# Patient Record
Sex: FEMALE | Race: BLACK OR AFRICAN AMERICAN | NOT HISPANIC OR LATINO | URBAN - METROPOLITAN AREA
[De-identification: names, ages, dates, MRNs, and addresses within clinical notes are randomized per-mention and may not be internally consistent; named-entity substitution may affect disease eponyms.]

---

## 2018-09-13 ENCOUNTER — EMERGENCY (EMERGENCY)
Facility: HOSPITAL | Age: 43
LOS: 1 days | Discharge: HOME | End: 2018-09-13
Admitting: EMERGENCY MEDICINE
Payer: MEDICAID

## 2018-09-13 VITALS
RESPIRATION RATE: 20 BRPM | OXYGEN SATURATION: 99 % | SYSTOLIC BLOOD PRESSURE: 126 MMHG | DIASTOLIC BLOOD PRESSURE: 66 MMHG | TEMPERATURE: 99 F | HEART RATE: 75 BPM

## 2018-09-13 LAB
ALBUMIN SERPL ELPH-MCNC: 4 G/DL — SIGNIFICANT CHANGE UP (ref 3.3–5)
ALP SERPL-CCNC: 88 U/L — SIGNIFICANT CHANGE UP (ref 40–120)
ALT FLD-CCNC: 15 U/L — SIGNIFICANT CHANGE UP (ref 4–33)
APPEARANCE UR: CLEAR — SIGNIFICANT CHANGE UP
AST SERPL-CCNC: 38 U/L — HIGH (ref 4–32)
BACTERIA # UR AUTO: NEGATIVE — SIGNIFICANT CHANGE UP
BASOPHILS # BLD AUTO: 0.02 K/UL — SIGNIFICANT CHANGE UP (ref 0–0.2)
BASOPHILS NFR BLD AUTO: 0.3 % — SIGNIFICANT CHANGE UP (ref 0–2)
BILIRUB SERPL-MCNC: 0.3 MG/DL — SIGNIFICANT CHANGE UP (ref 0.2–1.2)
BILIRUB UR-MCNC: NEGATIVE — SIGNIFICANT CHANGE UP
BLOOD UR QL VISUAL: HIGH
BUN SERPL-MCNC: 8 MG/DL — SIGNIFICANT CHANGE UP (ref 7–23)
CALCIUM SERPL-MCNC: 9 MG/DL — SIGNIFICANT CHANGE UP (ref 8.4–10.5)
CHLORIDE SERPL-SCNC: 100 MMOL/L — SIGNIFICANT CHANGE UP (ref 98–107)
CO2 SERPL-SCNC: 21 MMOL/L — LOW (ref 22–31)
COLOR SPEC: SIGNIFICANT CHANGE UP
CREAT SERPL-MCNC: 0.67 MG/DL — SIGNIFICANT CHANGE UP (ref 0.5–1.3)
EOSINOPHIL # BLD AUTO: 0.25 K/UL — SIGNIFICANT CHANGE UP (ref 0–0.5)
EOSINOPHIL NFR BLD AUTO: 4 % — SIGNIFICANT CHANGE UP (ref 0–6)
GLUCOSE SERPL-MCNC: 79 MG/DL — SIGNIFICANT CHANGE UP (ref 70–99)
GLUCOSE UR-MCNC: NEGATIVE — SIGNIFICANT CHANGE UP
HCG SERPL-ACNC: 1003 MIU/ML — SIGNIFICANT CHANGE UP
HCT VFR BLD CALC: 38.8 % — SIGNIFICANT CHANGE UP (ref 34.5–45)
HGB BLD-MCNC: 12.5 G/DL — SIGNIFICANT CHANGE UP (ref 11.5–15.5)
HYALINE CASTS # UR AUTO: NEGATIVE — SIGNIFICANT CHANGE UP
IMM GRANULOCYTES # BLD AUTO: 0.02 # — SIGNIFICANT CHANGE UP
IMM GRANULOCYTES NFR BLD AUTO: 0.3 % — SIGNIFICANT CHANGE UP (ref 0–1.5)
KETONES UR-MCNC: NEGATIVE — SIGNIFICANT CHANGE UP
LEUKOCYTE ESTERASE UR-ACNC: NEGATIVE — SIGNIFICANT CHANGE UP
LYMPHOCYTES # BLD AUTO: 2.4 K/UL — SIGNIFICANT CHANGE UP (ref 1–3.3)
LYMPHOCYTES # BLD AUTO: 38 % — SIGNIFICANT CHANGE UP (ref 13–44)
MCHC RBC-ENTMCNC: 27.5 PG — SIGNIFICANT CHANGE UP (ref 27–34)
MCHC RBC-ENTMCNC: 32.2 % — SIGNIFICANT CHANGE UP (ref 32–36)
MCV RBC AUTO: 85.3 FL — SIGNIFICANT CHANGE UP (ref 80–100)
MONOCYTES # BLD AUTO: 0.46 K/UL — SIGNIFICANT CHANGE UP (ref 0–0.9)
MONOCYTES NFR BLD AUTO: 7.3 % — SIGNIFICANT CHANGE UP (ref 2–14)
NEUTROPHILS # BLD AUTO: 3.17 K/UL — SIGNIFICANT CHANGE UP (ref 1.8–7.4)
NEUTROPHILS NFR BLD AUTO: 50.1 % — SIGNIFICANT CHANGE UP (ref 43–77)
NITRITE UR-MCNC: NEGATIVE — SIGNIFICANT CHANGE UP
NRBC # FLD: 0 — SIGNIFICANT CHANGE UP
PH UR: 6 — SIGNIFICANT CHANGE UP (ref 5–8)
PLATELET # BLD AUTO: 260 K/UL — SIGNIFICANT CHANGE UP (ref 150–400)
PMV BLD: 11.8 FL — SIGNIFICANT CHANGE UP (ref 7–13)
POTASSIUM SERPL-MCNC: 5.3 MMOL/L — SIGNIFICANT CHANGE UP (ref 3.5–5.3)
POTASSIUM SERPL-SCNC: 5.3 MMOL/L — SIGNIFICANT CHANGE UP (ref 3.5–5.3)
PROT SERPL-MCNC: 7.8 G/DL — SIGNIFICANT CHANGE UP (ref 6–8.3)
PROT UR-MCNC: NEGATIVE — SIGNIFICANT CHANGE UP
RBC # BLD: 4.55 M/UL — SIGNIFICANT CHANGE UP (ref 3.8–5.2)
RBC # FLD: 14.8 % — HIGH (ref 10.3–14.5)
RBC CASTS # UR COMP ASSIST: >50 — HIGH (ref 0–?)
SODIUM SERPL-SCNC: 134 MMOL/L — LOW (ref 135–145)
SP GR SPEC: 1.01 — SIGNIFICANT CHANGE UP (ref 1–1.04)
SQUAMOUS # UR AUTO: SIGNIFICANT CHANGE UP
UROBILINOGEN FLD QL: NORMAL — SIGNIFICANT CHANGE UP
WBC # BLD: 6.32 K/UL — SIGNIFICANT CHANGE UP (ref 3.8–10.5)
WBC # FLD AUTO: 6.32 K/UL — SIGNIFICANT CHANGE UP (ref 3.8–10.5)
WBC UR QL: SIGNIFICANT CHANGE UP (ref 0–?)

## 2018-09-13 PROCEDURE — 76830 TRANSVAGINAL US NON-OB: CPT | Mod: 26

## 2018-09-13 PROCEDURE — 99284 EMERGENCY DEPT VISIT MOD MDM: CPT

## 2018-09-13 PROCEDURE — 99283 EMERGENCY DEPT VISIT LOW MDM: CPT | Mod: GC

## 2018-09-13 NOTE — ED ADULT TRIAGE NOTE - CHIEF COMPLAINT QUOTE
c/o vaginal bleeding abdominal cramping  pt took home pregnancy one week ago positive pregnancy test pt unsure of how many weeks pt last menstrual cycle July 30

## 2018-09-13 NOTE — ED ADULT NURSE NOTE - OBJECTIVE STATEMENT
43 y female A+Ox3 presents to the ER with the complaint of vaginal bleeding. Pt states 2 weeks ago she took an at home pregnancy test which said she was pregnant. Pt was to follow up with OBGYN tomorrow but around 4pm started experiencing heavy vaginal bleeding with the passing of clots. Pt has had 7 previous pregnancies with the last 3 being miscarriages. Denies any other PMH. Denies sob, chest pain, dizziness, N/V/D. 20 g placed in right ac, labs drawn and sent. Will continue to monitor.

## 2018-09-13 NOTE — ED PROVIDER NOTE - CHPI ED SYMPTOMS NEG
no dysuria/no fever/no vaginal discharge/no vomiting/no abdominal pain/no back pain/no chills/no discharge

## 2018-09-13 NOTE — ED PROVIDER NOTE - MEDICAL DECISION MAKING DETAILS
Pt is a 44 y/o F nonsmoker PMHx ectopic pregnancy, miscarriage, p/w vaginal bleeding in pregnancy since yesterday -- r/o ectopic pregnancy, threatened miscarriage -- labs, hcg, ua, ucx, transvaginal sono, T&S

## 2018-09-13 NOTE — ED PROVIDER NOTE - PROGRESS NOTE DETAILS
Rick SANCHEZ: Pt signed out to me.  She reports she is feeling better and would like to go home.  She has been evaluated by OB, plan for repeat beta in 48hours.  Pt understands importance of follow-up, stable for dc home.

## 2018-09-13 NOTE — ED PROVIDER NOTE - OBJECTIVE STATEMENT
Pt is a 44 y/o F nonsmoker PMHx ectopic pregnancy, miscarriage, p/w vaginal bleeding in pregnancy since yesterday.  Pt is .  Pt states she had positive home pregnancy test yesterday after which pt had vaginal spotting.  Pt states bleeding mildly heavier today, requiring one pad today.  Pt notes mild pelvic cramping, similar to menstrual cramps.  Pt denies any fevers, chills, nausea, vomiting, chest pain, SOB, dysuria, cloudy urine, diarrhea, constipation, melena, brbpr.  Pt has not yet had an evaluation by OB/GYN.

## 2018-09-13 NOTE — ED PROVIDER NOTE - NONTENDER LOCATION
periumbilical/left costovertebral angle/left upper quadrant/right upper quadrant/right costovertebral angle/right lower quadrant/left lower quadrant/umbilical/suprapubic

## 2018-09-14 VITALS
TEMPERATURE: 98 F | DIASTOLIC BLOOD PRESSURE: 53 MMHG | OXYGEN SATURATION: 100 % | SYSTOLIC BLOOD PRESSURE: 111 MMHG | RESPIRATION RATE: 18 BRPM | HEART RATE: 71 BPM

## 2018-09-14 DIAGNOSIS — O20.8 OTHER HEMORRHAGE IN EARLY PREGNANCY: ICD-10-CM

## 2018-09-14 LAB — RH IG SCN BLD-IMP: NEGATIVE — SIGNIFICANT CHANGE UP

## 2018-09-14 NOTE — CONSULT NOTE ADULT - ASSESSMENT
42 yo  6w4d presents with vaginal bleeding. Physical exam unremarkable. bHCG 1003, below the zone of discrimination. TVUS shows no evidence of pregnancy, Blood type Rh negative.

## 2018-09-14 NOTE — CONSULT NOTE ADULT - SUBJECTIVE AND OBJECTIVE BOX
43y G_P_ presents with   HPI:      OB/GYN HISTORY:   [place]:  Parity:  Term:  :  MAB/TAB/Ectopic:  Living:    Last Menstrual Period    Name of GYN Physician:  Date of Last Pap:  History of Abnormal Pap:  Date of Last Mammogram:  Date of Last Colonoscopy:  Current OCP use:     PAST MEDICAL & SURGICAL HISTORY:  Miscarriage  Ectopic pregnancy      REVIEW OF SYSTEMS      General:	    Skin/Breast:  	  Ophthalmologic:  	  ENMT:	    Respiratory and Thorax:  	  Cardiovascular:	    Gastrointestinal:	    Genitourinary:	    Musculoskeletal:	    Neurological:	    Psychiatric:	    Hematology/Lymphatics:	    Endocrine:	    Allergic/Immunologic:	    MEDICATIONS  (STANDING):    MEDICATIONS  (PRN):      Allergies    No Known Allergies    Intolerances        SOCIAL HISTORY:    FAMILY HISTORY:      Vital Signs Last 24 Hrs  T(C): 36.9 (14 Sep 2018 02:14), Max: 37.1 (13 Sep 2018 17:43)  T(F): 98.4 (14 Sep 2018 02:14), Max: 98.7 (13 Sep 2018 17:43)  HR: 71 (14 Sep 2018 02:14) (71 - 75)  BP: 111/53 (14 Sep 2018 02:14) (111/53 - 126/66)  BP(mean): --  RR: 18 (14 Sep 2018 02:14) (18 - 20)  SpO2: 100% (14 Sep 2018 02:14) (99% - 100%)    PHYSICAL EXAM:      Constitutional: alert and oriented x 3    Breasts: no tenderness, no nodules    Respiratory: clear    Cardiovascular: regular rate and rhythm    Gastrointestinal: soft, non tender, + bowel sounds. No hepatosplenomegaly, no palpable masses    Genitourinary: NEFG  Cervix: closed/ long, no CMT  Uterus: normal size, non tender  Adnexa: non tender, no palpable masses    Rectal:     Extremities:    Neurological:    Skin:    Lymph Nodes:        LABS:                        12.5   6.32  )-----------( 260      ( 13 Sep 2018 20:41 )             38.8         134<L>  |  100  |  8   ----------------------------<  79  5.3   |  21<L>  |  0.67    Ca    9.0      13 Sep 2018 20:41    TPro  7.8  /  Alb  4.0  /  TBili  0.3  /  DBili  x   /  AST  38<H>  /  ALT  15  /  AlkPhos  88        Urinalysis Basic - ( 13 Sep 2018 20:45 )    Color: LIGHT YELLOW / Appearance: CLEAR / S.011 / pH: 6.0  Gluc: NEGATIVE / Ketone: NEGATIVE  / Bili: NEGATIVE / Urobili: NORMAL   Blood: LARGE / Protein: NEGATIVE / Nitrite: NEGATIVE   Leuk Esterase: NEGATIVE / RBC: >50 / WBC 0-2   Sq Epi: OCC / Non Sq Epi: x / Bacteria: NEGATIVE        RADIOLOGY & ADDITIONAL STUDIES: R2 Consult Note    43y  LMP  6w4d pregnant presents with vaginal bleeding and abdominal pain for 8 hours. She states that earlier this evening, she began to experience heavy vaginal bleeding soaking thru 1 pad per hour and decided to come to the ED. She found out she was pregnant 2 weeks ago by home pregnancy test but has not had her first prenatal visit yet. She additionally reports crampy abdominal pain that is non-radiating and is a 6/10 on the pain scale. Denies lightheadedness, dizziness, CP, SOB, palpitations, dysuria, urinary urgency/frequency.     OB/GYN HISTORY:   [place]: 7  Parity: 4  x4 in , , ,   Term: 4  : 0  MAB/TAB/Ectopic: 2 sab. patient specifically states that she passed POC on her own and never received medication for passage of tissues (cytotec, methotrexate).  [place]    Last Menstrual Period 18    Name of GYN Physician: Dr. Zuniga     PAST MEDICAL & SURGICAL HISTORY:  Miscarriage        REVIEW OF SYSTEMS  General: denies fevers, chills, sweats, lightheadedness, dizziness  Respiratory and Thorax:	denies SOB  Cardiovascular:	denies CP, palpitations  Gastrointestinal:	+abdominal cramping. Denies N/v  Genitourinary: +vaginal bleeding. Denies dysuria, urinary urgency/frequency        MEDICATIONS  (STANDING):    MEDICATIONS  (PRN):      Allergies    No Known Allergies    Intolerances        SOCIAL HISTORY: denies x3        Vital Signs Last 24 Hrs  T(C): 36.9 (14 Sep 2018 02:14), Max: 37.1 (13 Sep 2018 17:43)  T(F): 98.4 (14 Sep 2018 02:14), Max: 98.7 (13 Sep 2018 17:43)  HR: 71 (14 Sep 2018 02:14) (71 - 75)  BP: 111/53 (14 Sep 2018 02:14) (111/53 - 126/66)  BP(mean): --  RR: 18 (14 Sep 2018 02:14) (18 - 20)  SpO2: 100% (14 Sep 2018 02:14) (99% - 100%)    PHYSICAL EXAM:  Constitutional: alert and oriented x 3  Respiratory: CTA-B, symmetrical expansion  Cardiovascular: RRR, no murmurs, S1, S2  Gastrointestinal: soft, non tender, + bowel sounds. No hepatosplenomegaly, no palpable masses  Genitourinary: NEFG  Cervix: closed/ long, no CMT, small amount of dark red blood in vaginal vault with no active bleeding noted from cervix  Uterus: normal size, non tender  Adnexa: non tender, no palpable masses        LABS:                        12.5   6.32  )-----------( 260      ( 13 Sep 2018 20:41 )             38.8         134<L>  |  100  |  8   ----------------------------<  79  5.3   |  21<L>  |  0.67    Ca    9.0      13 Sep 2018 20:41    TPro  7.8  /  Alb  4.0  /  TBili  0.3  /  DBili  x   /  AST  38<H>  /  ALT  15  /  AlkPhos  88        Urinalysis Basic - ( 13 Sep 2018 20:45 )    Color: LIGHT YELLOW / Appearance: CLEAR / S.011 / pH: 6.0  Gluc: NEGATIVE / Ketone: NEGATIVE  / Bili: NEGATIVE / Urobili: NORMAL   Blood: LARGE / Protein: NEGATIVE / Nitrite: NEGATIVE   Leuk Esterase: NEGATIVE / RBC: >50 / WBC 0-2   Sq Epi: OCC / Non Sq Epi: x / Bacteria: NEGATIVE        RADIOLOGY & ADDITIONAL STUDIES:  EXAM:  US TRANSVAGINAL        PROCEDURE DATE:  Sep 13 2018         INTERPRETATION:  CLINICAL INFORMATION: Heavy vaginal bleeding with clots   for one day. Beta-hCG levels 103.    LMP: 2018.    Gestational age by LMP: 6 weeks 3 days    COMPARISON: None available.    TECHNIQUE:     Endovaginal pelvic sonogram as per order. Transabdominal pelvic sonogram   was also performed as part of our standard protocol. Color and Spectral   Doppler was performed.    FINDINGS:    Uterus: 13.1 x 6.7 x 7.9 cm. No intrauterine gestational sac or fetal   pole is noted. Fundal fibroid measuring 2.9 x 2.3 x 2.1 cm.    Right ovary: 3.3 x 2.1 x 2.2 cm. Within normal limits. Doppler flow is   within normal limits    Left ovary: 3.2 x 3.1 x 2.9 cm. Within normal limits. Doppler flow is   within normal limits.    Fluid: None.    IMPRESSION:    No evidence of a intrauterine gestational sac or fetal pole. Differential   includes early pregnancy, missed  or ectopic pregnancy. Further   evaluation with serial beta-hCG levels and short term follow-up   ultrasound is recommended.    Fibroid uterus

## 2018-09-14 NOTE — CONSULT NOTE ADULT - PROBLEM SELECTOR RECOMMENDATION 9
- rhogam for Rh negative status  - patient to return to ED in 2 days for repeat bHCG to evaluate for trend  - Patient encouraged to return to ED if she experiences any of the following symptoms: heavy vaginal bleeding (soaking thru 1 pad per hour x 2 hours), pain uncontrolled with pain mediations, lightheadedness, dizziness, syncope    seen with Dr. Mayank Becerra pgy2

## 2018-09-15 ENCOUNTER — EMERGENCY (EMERGENCY)
Facility: HOSPITAL | Age: 43
LOS: 1 days | Discharge: HOME | End: 2018-09-15
Attending: EMERGENCY MEDICINE | Admitting: EMERGENCY MEDICINE
Payer: MEDICAID

## 2018-09-15 VITALS
TEMPERATURE: 99 F | SYSTOLIC BLOOD PRESSURE: 140 MMHG | HEART RATE: 72 BPM | RESPIRATION RATE: 20 BRPM | OXYGEN SATURATION: 100 % | DIASTOLIC BLOOD PRESSURE: 70 MMHG

## 2018-09-15 LAB
ALBUMIN SERPL ELPH-MCNC: 3.6 G/DL — SIGNIFICANT CHANGE UP (ref 3.3–5)
ALP SERPL-CCNC: 88 U/L — SIGNIFICANT CHANGE UP (ref 40–120)
ALT FLD-CCNC: 8 U/L — SIGNIFICANT CHANGE UP (ref 4–33)
AST SERPL-CCNC: 15 U/L — SIGNIFICANT CHANGE UP (ref 4–32)
BACTERIA UR CULT: SIGNIFICANT CHANGE UP
BASOPHILS # BLD AUTO: 0.02 K/UL — SIGNIFICANT CHANGE UP (ref 0–0.2)
BASOPHILS NFR BLD AUTO: 0.3 % — SIGNIFICANT CHANGE UP (ref 0–2)
BILIRUB SERPL-MCNC: 0.2 MG/DL — SIGNIFICANT CHANGE UP (ref 0.2–1.2)
BLD GP AB SCN SERPL QL: POSITIVE — SIGNIFICANT CHANGE UP
BUN SERPL-MCNC: 9 MG/DL — SIGNIFICANT CHANGE UP (ref 7–23)
CALCIUM SERPL-MCNC: 9 MG/DL — SIGNIFICANT CHANGE UP (ref 8.4–10.5)
CHLORIDE SERPL-SCNC: 101 MMOL/L — SIGNIFICANT CHANGE UP (ref 98–107)
CO2 SERPL-SCNC: 23 MMOL/L — SIGNIFICANT CHANGE UP (ref 22–31)
CREAT SERPL-MCNC: 0.72 MG/DL — SIGNIFICANT CHANGE UP (ref 0.5–1.3)
EOSINOPHIL # BLD AUTO: 0.28 K/UL — SIGNIFICANT CHANGE UP (ref 0–0.5)
EOSINOPHIL NFR BLD AUTO: 3.8 % — SIGNIFICANT CHANGE UP (ref 0–6)
GLUCOSE SERPL-MCNC: 90 MG/DL — SIGNIFICANT CHANGE UP (ref 70–99)
HCG SERPL-ACNC: 652.2 MIU/ML — SIGNIFICANT CHANGE UP
HCT VFR BLD CALC: 33.9 % — LOW (ref 34.5–45)
HGB BLD-MCNC: 11.1 G/DL — LOW (ref 11.5–15.5)
IMM GRANULOCYTES # BLD AUTO: 0.02 # — SIGNIFICANT CHANGE UP
IMM GRANULOCYTES NFR BLD AUTO: 0.3 % — SIGNIFICANT CHANGE UP (ref 0–1.5)
LYMPHOCYTES # BLD AUTO: 2.53 K/UL — SIGNIFICANT CHANGE UP (ref 1–3.3)
LYMPHOCYTES # BLD AUTO: 34 % — SIGNIFICANT CHANGE UP (ref 13–44)
MCHC RBC-ENTMCNC: 28.6 PG — SIGNIFICANT CHANGE UP (ref 27–34)
MCHC RBC-ENTMCNC: 32.7 % — SIGNIFICANT CHANGE UP (ref 32–36)
MCV RBC AUTO: 87.4 FL — SIGNIFICANT CHANGE UP (ref 80–100)
MONOCYTES # BLD AUTO: 0.74 K/UL — SIGNIFICANT CHANGE UP (ref 0–0.9)
MONOCYTES NFR BLD AUTO: 9.9 % — SIGNIFICANT CHANGE UP (ref 2–14)
NEUTROPHILS # BLD AUTO: 3.85 K/UL — SIGNIFICANT CHANGE UP (ref 1.8–7.4)
NEUTROPHILS NFR BLD AUTO: 51.7 % — SIGNIFICANT CHANGE UP (ref 43–77)
NRBC # FLD: 0 — SIGNIFICANT CHANGE UP
PLATELET # BLD AUTO: 174 K/UL — SIGNIFICANT CHANGE UP (ref 150–400)
PMV BLD: 12.2 FL — SIGNIFICANT CHANGE UP (ref 7–13)
POTASSIUM SERPL-MCNC: 4.4 MMOL/L — SIGNIFICANT CHANGE UP (ref 3.5–5.3)
POTASSIUM SERPL-SCNC: 4.4 MMOL/L — SIGNIFICANT CHANGE UP (ref 3.5–5.3)
PROT SERPL-MCNC: 6.7 G/DL — SIGNIFICANT CHANGE UP (ref 6–8.3)
RBC # BLD: 3.88 M/UL — SIGNIFICANT CHANGE UP (ref 3.8–5.2)
RBC # FLD: 14.8 % — HIGH (ref 10.3–14.5)
RH IG SCN BLD-IMP: NEGATIVE — SIGNIFICANT CHANGE UP
SODIUM SERPL-SCNC: 137 MMOL/L — SIGNIFICANT CHANGE UP (ref 135–145)
SPECIMEN SOURCE: SIGNIFICANT CHANGE UP
WBC # BLD: 7.44 K/UL — SIGNIFICANT CHANGE UP (ref 3.8–10.5)
WBC # FLD AUTO: 7.44 K/UL — SIGNIFICANT CHANGE UP (ref 3.8–10.5)

## 2018-09-15 PROCEDURE — 99283 EMERGENCY DEPT VISIT LOW MDM: CPT

## 2018-09-15 PROCEDURE — 76830 TRANSVAGINAL US NON-OB: CPT | Mod: 26

## 2018-09-15 PROCEDURE — 99285 EMERGENCY DEPT VISIT HI MDM: CPT

## 2018-09-15 NOTE — ED PROVIDER NOTE - OBJECTIVE STATEMENT
43female , LMP 6weeks ago, cc vaginal bleeding with cramping since Thursday. patient reports passing one big clot about an hour ago associated with lower abdominal cramping, and then symptoms improved. patient still reports bleeding with mild cramping currently. Reports taking two aleve for the pain. patient had home test to determine she was pregnant and has not seen outpatient OB for this pregnancy yet. Was seen on  for similar complaints   ultrasound showed no evidence of a intrauterine gestational sac or fetal pole, bHCG 1003. Denies back pain, fever, chills, nausea, vomiting, chest pain, shortness of breath, leg swelling, headaches.

## 2018-09-15 NOTE — ED ADULT TRIAGE NOTE - CHIEF COMPLAINT QUOTE
pt sent for repeat hcg/sonogram--pt 6 weeks pregnant and has pain and vaginal bleeding--seen here thursday

## 2018-09-15 NOTE — ED ADULT NURSE NOTE - OBJECTIVE STATEMENT
pt aox4; lmp 6 weeks ago. reports vaginal bleeding since thursday with pain that she describes as cramping.  Pt reports passing a clot an hour ago with relief of pain after passing the clot.  At bedside as chaperone for pelvic exam, with MD Rodriguez.  Pt bleeding at this time moderate amount of blood.  No clots noted.  pt in no respiratory distress. respirations even/ unlabored.  VSS.  labs sent. IV in place Will continue to monitor/assess

## 2018-09-15 NOTE — ED PROVIDER NOTE - PROGRESS NOTE DETAILS
Jennifer, PGY-4: Patient with no pain. Benign abd exam. Seen by OB. Will follow up in one week. US and labs consistent with likely miscarriage.

## 2018-09-15 NOTE — ED ADULT NURSE NOTE - NSIMPLEMENTINTERV_GEN_ALL_ED
Implemented All Universal Safety Interventions:  [place] to call system. Call bell, personal items and telephone within reach. Instruct patient to call for assistance. Room bathroom lighting operational. Non-slip footwear when patient is off stretcher. Physically safe environment: no spills, clutter or unnecessary equipment. Stretcher in lowest position, wheels locked, appropriate side rails in place.

## 2018-09-15 NOTE — ED PROVIDER NOTE - CHIEF COMPLAINT
The patient is a 43y Female complaining of The patient is a 43y Female complaining of vaginal bleeding

## 2018-09-15 NOTE — ED PROVIDER NOTE - PHYSICAL EXAMINATION
Physical Exam:  Gen: NAD, AOx3, non-toxic appearing  Head: NCAT  HEENT: normal conjunctiva, oral mucosa moist  Lung: CTAB, no respiratory distress, no wheezes/rhonchi/rales B/L, speaking in full sentences  CV: RRR, no murmurs, rubs or gallops  Abd: soft, mildly distended, mild tenderness to suprapubic area and both lower quadrants , no guarding, no CVA tenderness, no rebound tenderness  MSK: no visible deformities  : chaperone bee RN, speculum exam, closed cervical os, blood in fornix, no adnexal tenderness, cervical tenderness  Neuro: No focal sensory or motor deficits  Skin: Warm, well perfused, no rash  Psych: normal affect  ~Hitesh Rodriguez D.O. -Resident

## 2018-09-15 NOTE — ED PROVIDER NOTE - MEDICAL DECISION MAKING DETAILS
44yo female, vaginal bleeding with pain, hx of miscarriages, clot passage today with pain, os closed, repeat bhcg, repeat sono, labs to eval for anemia, analgesia as necessary, likely . less concern for ruptured ectopic with normal vital signs, no rigidity on exam

## 2018-09-15 NOTE — ED PROVIDER NOTE - ATTENDING CONTRIBUTION TO CARE
I performed a face to face history and physical exam of the patient and discussed their management with the resident. I reviewed the resident's note and agree with the documented findings and plan of care. 42 y/o female , LMP , 6 weeks pregnant, seen here Thursday with vaginal bleeding, B-HCG ~1000, pelvic sono with no visualization of sac, told to return 48 hours later, passed clot today, still bleeding and cramping, no dizziness, no SOB, no CP, no n/v, abd soft nt  1)CBC, CMP, BHCG quant 2)Pelvic sono 3)ob 4)Reevaluate

## 2018-09-15 NOTE — ED PROVIDER NOTE - NS ED ROS FT
ROS:  GENERAL: No fever or chills  EYES: no change in vision  HEENT: no trouble speaking  CARDIAC: no chest pain  PULMONARY: no cough or SOB  GI: + suprapubic midline abdominal pain, no nausea or no vomiting, no diarrhea or constipation  : No changes in urination  SKIN: no rashes  NEURO: no headache  MSK: No joint pain   ~Hitesh Rodriguez D.O. -Resident

## 2018-09-16 VITALS
TEMPERATURE: 98 F | DIASTOLIC BLOOD PRESSURE: 72 MMHG | OXYGEN SATURATION: 98 % | SYSTOLIC BLOOD PRESSURE: 132 MMHG | RESPIRATION RATE: 18 BRPM | HEART RATE: 70 BPM

## 2018-09-16 PROBLEM — O00.90 UNSPECIFIED ECTOPIC PREGNANCY WITHOUT INTRAUTERINE PREGNANCY: Chronic | Status: ACTIVE | Noted: 2018-09-13

## 2018-09-16 PROBLEM — O03.9 COMPLETE OR UNSPECIFIED SPONTANEOUS ABORTION WITHOUT COMPLICATION: Chronic | Status: ACTIVE | Noted: 2018-09-13

## 2018-09-16 LAB
AMORPH CRY # UR COMP ASSIST: SIGNIFICANT CHANGE UP (ref 0–0)
APPEARANCE UR: SIGNIFICANT CHANGE UP
BACTERIA # UR AUTO: NEGATIVE — SIGNIFICANT CHANGE UP
BILIRUB UR-MCNC: NEGATIVE — SIGNIFICANT CHANGE UP
BLOOD UR QL VISUAL: NEGATIVE — SIGNIFICANT CHANGE UP
COLOR SPEC: SIGNIFICANT CHANGE UP
GLUCOSE UR-MCNC: 50 — SIGNIFICANT CHANGE UP
HYALINE CASTS # UR AUTO: NEGATIVE — SIGNIFICANT CHANGE UP
KETONES UR-MCNC: NEGATIVE — SIGNIFICANT CHANGE UP
LEUKOCYTE ESTERASE UR-ACNC: NEGATIVE — SIGNIFICANT CHANGE UP
NITRITE UR-MCNC: NEGATIVE — SIGNIFICANT CHANGE UP
PH UR: 7.5 — SIGNIFICANT CHANGE UP (ref 5–8)
PROT UR-MCNC: 30 — SIGNIFICANT CHANGE UP
RBC CASTS # UR COMP ASSIST: SIGNIFICANT CHANGE UP (ref 0–?)
SP GR SPEC: 1.02 — SIGNIFICANT CHANGE UP (ref 1–1.04)
SQUAMOUS # UR AUTO: SIGNIFICANT CHANGE UP
UROBILINOGEN FLD QL: SIGNIFICANT CHANGE UP
WBC UR QL: SIGNIFICANT CHANGE UP (ref 0–?)

## 2018-09-16 PROCEDURE — 86077 PHYS BLOOD BANK SERV XMATCH: CPT

## 2018-09-16 NOTE — CONSULT NOTE ADULT - ASSESSMENT
44yo  LMP 18 here for f/u pregnancy of unknown location, likely SAB in the setting of downtrending serum bhCG  - pt is stable w downtrending beta hCG, TVUS showing no IUP or evidence of ectopic, and clinical exam is benign  - blood type is B-, received Rhogam on   - will continue to follow beta hCG, pt to be set up w a GYN appt for a beta hCG in our clinic next week Thursday, will call pt to confirm appt  - pt given ectopic and bleeding precautions, return to ED PRN    Pt seen and d/w Dr. Mayank Hsieh PGY2

## 2018-09-16 NOTE — ED ADULT NURSE REASSESSMENT NOTE - NS ED NURSE REASSESS COMMENT FT1
pt  aox4. returned from ultra sound; second type and screen sent at this time.  pt denies pain/discomfort. Will continue to monitor/assess

## 2018-09-16 NOTE — CONSULT NOTE ADULT - SUBJECTIVE AND OBJECTIVE BOX
R2 GYN CONSULT NOTE    HPI:  43y  LMP  here for f/u beta hCG draw.  Was seen in ED on  w betahCG of 1003 and TVUS showing no evidence of IUP.  Denies any abd/pelvic pain, denies lightheadedness, dizziness, CP, SOB, fevers, and chills.  Does endorse vaginal bleeding, passed a clot earlier today in the ED.  Not bleeding more than a period, not currently actively bleeding.    OB/GYN HISTORY: NSVDx4 (, , , ), SABx2    Last Menstrual Period 18    Name of GYN Physician: Dr. Zuniga     PAST MEDICAL & SURGICAL HISTORY:  Miscarriage    ROS: see HPI, otherwise negative      Vital Signs Last 24 Hrs  T(C): 36.8 (16 Sep 2018 00:52), Max: 37 (15 Sep 2018 19:57)  T(F): 98.2 (16 Sep 2018 00:52), Max: 98.6 (15 Sep 2018 19:57)  HR: 70 (16 Sep 2018 00:52) (70 - 75)  BP: 132/72 (16 Sep 2018 00:52) (129/55 - 140/70)  RR: 18 (16 Sep 2018 00:52) (18 - 20)  SpO2: 98% (16 Sep 2018 00:52) (98% - 100%)      PE:  Gen: Comfortable, NAD  CV: RRR  Pulm: CTAB  Abd: Soft, NT, no rebound or guarding, non distended  Ext: No edema or tenderness bilaterally  : No active vaginal bleeding noted, minimal bleeding on pad    LABS:                        11.1   7.44  )-----------( 174      ( 15 Sep 2018 21:05 )             33.9     09-15    137  |  101  |  9   ----------------------------<  90  4.4   |  23  |  0.72    Ca    9.0      15 Sep 2018 21:05    TPro  6.7  /  Alb  3.6  /  TBili  0.2  /  DBili  x   /  AST  15  /  ALT  8   /  AlkPhos  88  -15    Serum beta hC.2      RADIOLOGY & ADDITIONAL STUDIES:  < from: US Transvaginal (09.15.18 @ 23:17) >  EXAM:  US TRANSVAGINAL        PROCEDURE DATE:  Sep 15 2018    INTERPRETATION:  CLINICAL INFORMATION: Vaginal bleeding, evaluate for   ectopic pregnancy.  Beta-hCG is 652 on September 15, 2018, beta-hCG was 1,003 on 2018.    LMP: 2018    COMPARISON: Pelvic ultrasound from 2018.    TECHNIQUE:   Endovaginal and transabdominal pelvic sonogram. Color and Spectral   Doppler was performed.    FINDINGS:  Uterus: 12.2 x 7.5 x 8.7 cm. No intrauterine gestational sac. Small   posterior uterine body fibroid measuring up to  2 cm.      Endometrium: 2.4 cm. Within normal limits.    Right ovary: 2.8 x 2.4 x 2.0 cm. Within normal limits. Normal Doppler   flow and spectral waveforms.    Left ovary: 4.4 x 3.0 x 3.5. cm. Within normal limits. Normal Doppler   flow and spectral waveforms.    Fluid: None.    IMPRESSION:  No intrauterine gestational sac or fetal pole identified. Differential   remains early pregnancy, missed  or ectopic pregnancy. Declining  patient's beta-hCG is reassuring. Recommend short-term interval follow-up   until beta-hCG is within normal limits.    DALTON MERCEDES M.D., RADIOLOGY RESIDENT  This document has been electronically signed.  TRAY JONES M.D., ATTENDING RADIOLOGIST  This document has been electronically signed. Sep 16 2018 12:41AM      < end of copied text > R2 GYN CONSULT NOTE  (Back note due to clinical duties seeing another patient, this pt was seen in ED at 12:45am today)    HPI:  43y  LMP  here for f/u beta hCG draw.  Was seen in ED on  w betahCG of 1003 and TVUS showing no evidence of IUP.  Denies any abd/pelvic pain, denies lightheadedness, dizziness, CP, SOB, fevers, and chills.  Does endorse vaginal bleeding, passed a clot earlier today in the ED.  Not bleeding more than a period, not currently actively bleeding.    OB/GYN HISTORY: NSVDx4 (, , ', '), SABx2    Last Menstrual Period 18    Name of GYN Physician: Dr. Zuniga     PAST MEDICAL & SURGICAL HISTORY:  Miscarriage    ROS: see HPI, otherwise negative      Vital Signs Last 24 Hrs  T(C): 36.8 (16 Sep 2018 00:52), Max: 37 (15 Sep 2018 19:57)  T(F): 98.2 (16 Sep 2018 00:52), Max: 98.6 (15 Sep 2018 19:57)  HR: 70 (16 Sep 2018 00:52) (70 - 75)  BP: 132/72 (16 Sep 2018 00:52) (129/55 - 140/70)  RR: 18 (16 Sep 2018 00:52) (18 - 20)  SpO2: 98% (16 Sep 2018 00:52) (98% - 100%)      PE:  Gen: Comfortable, NAD  CV: RRR  Pulm: CTAB  Abd: Soft, NT, no rebound or guarding, non distended  Ext: No edema or tenderness bilaterally  : No active vaginal bleeding noted, minimal bleeding on pad    LABS:                        11.1   7.44  )-----------( 174      ( 15 Sep 2018 21:05 )             33.9     09-15    137  |  101  |  9   ----------------------------<  90  4.4   |  23  |  0.72    Ca    9.0      15 Sep 2018 21:05    TPro  6.7  /  Alb  3.6  /  TBili  0.2  /  DBili  x   /  AST  15  /  ALT  8   /  AlkPhos  88  09-15    Serum beta hC.2      RADIOLOGY & ADDITIONAL STUDIES:  < from: US Transvaginal (09.15.18 @ 23:17) >  EXAM:  US TRANSVAGINAL        PROCEDURE DATE:  Sep 15 2018    INTERPRETATION:  CLINICAL INFORMATION: Vaginal bleeding, evaluate for   ectopic pregnancy.  Beta-hCG is 652 on September 15, 2018, beta-hCG was 1,003 on 2018.    LMP: 2018    COMPARISON: Pelvic ultrasound from 2018.    TECHNIQUE:   Endovaginal and transabdominal pelvic sonogram. Color and Spectral   Doppler was performed.    FINDINGS:  Uterus: 12.2 x 7.5 x 8.7 cm. No intrauterine gestational sac. Small   posterior uterine body fibroid measuring up to  2 cm.      Endometrium: 2.4 cm. Within normal limits.    Right ovary: 2.8 x 2.4 x 2.0 cm. Within normal limits. Normal Doppler   flow and spectral waveforms.    Left ovary: 4.4 x 3.0 x 3.5. cm. Within normal limits. Normal Doppler   flow and spectral waveforms.    Fluid: None.    IMPRESSION:  No intrauterine gestational sac or fetal pole identified. Differential   remains early pregnancy, missed  or ectopic pregnancy. Declining  patient's beta-hCG is reassuring. Recommend short-term interval follow-up   until beta-hCG is within normal limits.    DALTON MERCEDES M.D., RADIOLOGY RESIDENT  This document has been electronically signed.  TRAY JONES M.D., ATTENDING RADIOLOGIST  This document has been electronically signed. Sep 16 2018 12:41AM      < end of copied text >

## 2018-09-17 PROBLEM — Z00.00 ENCOUNTER FOR PREVENTIVE HEALTH EXAMINATION: Status: ACTIVE | Noted: 2018-09-17

## 2018-09-21 ENCOUNTER — OUTPATIENT (OUTPATIENT)
Dept: OUTPATIENT SERVICES | Facility: HOSPITAL | Age: 43
LOS: 1 days | End: 2018-09-21

## 2018-09-21 ENCOUNTER — LABORATORY RESULT (OUTPATIENT)
Age: 43
End: 2018-09-21

## 2018-09-21 ENCOUNTER — APPOINTMENT (OUTPATIENT)
Dept: OBGYN | Facility: HOSPITAL | Age: 43
End: 2018-09-21
Payer: MEDICAID

## 2018-09-21 VITALS — SYSTOLIC BLOOD PRESSURE: 117 MMHG | DIASTOLIC BLOOD PRESSURE: 71 MMHG

## 2018-09-21 VITALS — BODY MASS INDEX: 34.66 KG/M2 | WEIGHT: 203 LBS | HEIGHT: 64 IN | HEART RATE: 72 BPM

## 2018-09-21 LAB — HCG SERPL-ACNC: 90.54 MIU/ML — SIGNIFICANT CHANGE UP

## 2018-09-21 PROCEDURE — 99212 OFFICE O/P EST SF 10 MIN: CPT | Mod: GE

## 2018-09-24 DIAGNOSIS — Z34.90 ENCOUNTER FOR SUPERVISION OF NORMAL PREGNANCY, UNSPECIFIED, UNSPECIFIED TRIMESTER: ICD-10-CM

## 2018-10-03 ENCOUNTER — APPOINTMENT (OUTPATIENT)
Dept: OBGYN | Facility: HOSPITAL | Age: 43
End: 2018-10-03
Payer: MEDICAID

## 2018-10-03 ENCOUNTER — LABORATORY RESULT (OUTPATIENT)
Age: 43
End: 2018-10-03

## 2018-10-03 ENCOUNTER — OUTPATIENT (OUTPATIENT)
Dept: OUTPATIENT SERVICES | Facility: HOSPITAL | Age: 43
LOS: 1 days | End: 2018-10-03

## 2018-10-03 VITALS
WEIGHT: 198 LBS | SYSTOLIC BLOOD PRESSURE: 130 MMHG | HEART RATE: 78 BPM | BODY MASS INDEX: 33.8 KG/M2 | DIASTOLIC BLOOD PRESSURE: 76 MMHG | HEIGHT: 64 IN

## 2018-10-03 VITALS
BODY MASS INDEX: 33.8 KG/M2 | SYSTOLIC BLOOD PRESSURE: 133 MMHG | DIASTOLIC BLOOD PRESSURE: 79 MMHG | HEIGHT: 64 IN | WEIGHT: 198 LBS | HEART RATE: 70 BPM

## 2018-10-03 DIAGNOSIS — Z34.90 ENCOUNTER FOR SUPERVISION OF NORMAL PREGNANCY, UNSPECIFIED, UNSPECIFIED TRIMESTER: ICD-10-CM

## 2018-10-03 LAB — HCG SERPL-ACNC: < 5 MIU/ML — SIGNIFICANT CHANGE UP

## 2018-10-03 PROCEDURE — 99213 OFFICE O/P EST LOW 20 MIN: CPT | Mod: GE

## 2018-10-04 DIAGNOSIS — Z34.90 ENCOUNTER FOR SUPERVISION OF NORMAL PREGNANCY, UNSPECIFIED, UNSPECIFIED TRIMESTER: ICD-10-CM

## 2022-03-07 NOTE — ED PROVIDER NOTE - CPE EDP EYES NORM
MA spoke to patient and patient verified last name and date of birth. Patient informed of normal results from  normal results. Day 21 progesterone consistent with ovulation. Follow-up as planned. . Patient aware the following results are still pending NA. Patient verbalizes understanding, and was encouraged to call back at [phone] with any additional questions or concerns.  
normal...